# Patient Record
Sex: FEMALE | Race: WHITE
[De-identification: names, ages, dates, MRNs, and addresses within clinical notes are randomized per-mention and may not be internally consistent; named-entity substitution may affect disease eponyms.]

---

## 2018-02-24 ENCOUNTER — HOSPITAL ENCOUNTER (EMERGENCY)
Dept: HOSPITAL 25 - UCCORT | Age: 53
Discharge: HOME | End: 2018-02-24
Payer: COMMERCIAL

## 2018-02-24 VITALS — DIASTOLIC BLOOD PRESSURE: 89 MMHG | SYSTOLIC BLOOD PRESSURE: 129 MMHG

## 2018-02-24 DIAGNOSIS — M54.16: ICD-10-CM

## 2018-02-24 DIAGNOSIS — Z88.8: ICD-10-CM

## 2018-02-24 DIAGNOSIS — F41.8: ICD-10-CM

## 2018-02-24 DIAGNOSIS — J02.0: Primary | ICD-10-CM

## 2018-02-24 DIAGNOSIS — Z88.0: ICD-10-CM

## 2018-02-24 PROCEDURE — G0463 HOSPITAL OUTPT CLINIC VISIT: HCPCS

## 2018-02-24 PROCEDURE — 87502 INFLUENZA DNA AMP PROBE: CPT

## 2018-02-24 PROCEDURE — 99202 OFFICE O/P NEW SF 15 MIN: CPT

## 2018-02-24 PROCEDURE — 72110 X-RAY EXAM L-2 SPINE 4/>VWS: CPT

## 2018-02-24 PROCEDURE — 87651 STREP A DNA AMP PROBE: CPT

## 2018-02-24 NOTE — UC
Throat Pain/Nasal Adama HPI





- HPI Summary


HPI Summary: 


Starting yesterday with sore throat, body aches. Frontal sinus headache. Chills 

and fevers.





- History of Current Complaint


Stated Complaint: ST/BODY ACHES


Hx Obtained From: Patient


Hx Last Menstrual Period: 2/20/18


Pregnant?: No


Onset/Duration: Sudden Onset, Lasting Days - 1, Worse Since - lst night


Pain Intensity: 8


Cough: None


Associated Signs & Symptoms: Positive: Sinus Discomfort, Nasal Discharge, Fever


Related History: Seasonal Allergies





- Allergies/Home Medications


Allergies/Adverse Reactions: 


 Allergies











Allergy/AdvReac Type Severity Reaction Status Date / Time


 


amoxicillin [From Augmentin] AdvReac  Vomiting Verified 02/24/18 17:28


 


clavulanic acid AdvReac  Vomiting Verified 02/24/18 17:28





[From Augmentin]     











Home Medications: 


 Home Medications





Escitalopram Oxalate [Lexapro 20 mg] 20 mg PO DAILY 02/24/18 [History Confirmed 

02/24/18]


Ibuprofen TAB* [Advil TAB*] 600 mg PO Q6H PRN 02/24/18 [History Confirmed 02/24/ 18]


Lisinopril 20 mg PO DAILY 02/24/18 [History Confirmed 02/24/18]











PMH/Surg Hx/FS Hx/Imm Hx


Psychological History: Anxiety, Depression





- Surgical History


Surgical History: None





- Family History


Known Family History: 


   Negative: Hypertension





- Social History


Occupation: Employed Full-time


Lives: With Family


Alcohol Use: Weekly


Substance Use Type: None


Smoking Status (MU): Never Smoked Tobacco


Have You Smoked in the Last Year: No





Review of Systems


Constitutional: Fever, Chills


ENT: Sore Throat, Sinus Pain/Tenderness


Neurological: Numbness - Lateral posterior left leg


Is Patient Immunocompromised?: No


All Other Systems Reviewed And Are Negative: Yes





Physical Exam


Triage Information Reviewed: Yes


Appearance: No Pain Distress, Well-Nourished, Ill-Appearing


Vital Signs: 


 Initial Vital Signs











Temp  99.6 F   02/24/18 17:32


 


Pulse  94   02/24/18 17:32


 


Resp  16   02/24/18 17:32


 


BP  129/89   02/24/18 17:32


 


Pulse Ox  99   02/24/18 17:32











Vital Signs Reviewed: Yes


Eyes: Positive: Conjunctiva Clear


ENT: Positive: Pharyngeal erythema, Nasal congestion - with allergic changes, 

TMs normal


Neck exam: Normal


Respiratory Exam: Normal


Cardiovascular Exam: Normal


Musculoskeletal: Positive: Strength Limited @ - left calf


Neurological: Positive: Other: - decreased pinprick sensation L5S1 dermatome 

left leg. Absent DTR achilles left. 2+ bilateral patella and right achilles.


Psychological Exam: Normal


Skin Exam: Normal





Throat Pain/Nasal Course/Dx





- Differential Dx/Diagnosis


Differential Diagnosis/HQI/PQRI: Otitis Media, Pharyngitis, Tonsillitis


Provider Diagnoses: Strep pharyngitis.  Lumbar radiculopathy





Discharge





- Discharge Plan


Condition: Stable


Disposition: HOME


Prescriptions: 


Amoxicillin PO (*) [Amoxicillin 875 MG (*)] 875 mg PO BID #20 tab


Gabapentin CAP(*) [Neurontin 300 CAP(*)] 300 mg PO BEDTIME #30 cap


predniSONE TAB* [Deltasone TAB*] 20 mg PO DAILY #18 tab


Patient Education Materials:  Strep Throat (ED), Amoxicillin (By mouth), Lumbar 

Radiculopathy (ED), Gabapentin (By mouth)


Referrals: 


Rosalind Domingo [Primary Care Provider] - 5 Days (Pinched nerve in the back)

## 2018-02-24 NOTE — RAD
Indication: L5-S1 radiculopathy.



5 views of lumbar spine demonstrates disc space narrowing at L4-L5. The vertebral bodies

otherwise appear normal in height and alignment. There is mild levoscoliosis centered at

L2-L3. Pedicles appear intact. No lytic lesions are noted.



IMPRESSION: Degenerative disc disease at L4-L5 with mild levoscoliosis centered at L2-L3.

## 2019-02-18 ENCOUNTER — HOSPITAL ENCOUNTER (EMERGENCY)
Dept: HOSPITAL 25 - UCCORT | Age: 54
Discharge: HOME | End: 2019-02-18
Payer: COMMERCIAL

## 2019-02-18 VITALS — DIASTOLIC BLOOD PRESSURE: 82 MMHG | SYSTOLIC BLOOD PRESSURE: 132 MMHG

## 2019-02-18 DIAGNOSIS — R09.81: ICD-10-CM

## 2019-02-18 DIAGNOSIS — Z88.0: ICD-10-CM

## 2019-02-18 DIAGNOSIS — J02.0: Primary | ICD-10-CM

## 2019-02-18 DIAGNOSIS — I10: ICD-10-CM

## 2019-02-18 LAB
FLUAV RNA SPEC QL NAA+PROBE: NEGATIVE
FLUBV RNA SPEC QL NAA+PROBE: NEGATIVE

## 2019-02-18 PROCEDURE — 87651 STREP A DNA AMP PROBE: CPT

## 2019-02-18 PROCEDURE — G0463 HOSPITAL OUTPT CLINIC VISIT: HCPCS

## 2019-02-18 PROCEDURE — 99212 OFFICE O/P EST SF 10 MIN: CPT

## 2019-02-18 NOTE — UC
FLU HPI





- HPI Summary


HPI Summary: 


53-year-old female presents with onset of subjective fever, chills, and sore 

throat yesterday.  Associated with some mild nasal congestion and reports had 

one episode of nausea with vomiting yesterday.  Denies ear pain, dysphagia, 

chest pain, shortness of breath, cough, abdominal pain, diarrhea, dysuria, 

frequency, urgency, or hematuria.








- History of Current Complaint


Chief Complaint: UCGeneralIllness


Stated Complaint: FEVER,SORE THROAT,HEADACHE,NAUSEA


Time Seen by Provider: 02/18/19 15:34


Hx Obtained From: Patient


Hx Last Menstrual Period: 2/20/18


Pain Intensity: 6





- Allergy/Home Medications


Allergies/Adverse Reactions: 


 Allergies











Allergy/AdvReac Type Severity Reaction Status Date / Time


 


clavulanic acid AdvReac  Vomiting Verified 02/24/18 17:28





[From Augmentin]     














PMH/Surg Hx/FS Hx/Imm Hx


Previously Healthy: Yes


Cardiovascular History: Hypertension


Psychological History: Depression





- Surgical History


Surgical History: None





- Family History


Known Family History: Positive: Non-Contributory





- Social History


Occupation: Employed Full-time


Lives: Alone


Alcohol Use: Weekly


Substance Use Type: None


Smoking Status (MU): Never Smoked Tobacco


Have You Smoked in the Last Year: No





Review of Systems


All Other Systems Reviewed And Are Negative: Yes


Constitutional: Positive: Fever - Subjective, Chills, Fatigue


Skin: Negative: Rash


Eyes: Negative: Drainage, Eye Redness


ENT: Positive: Sore Throat, Nasal Discharge.  Negative: Ear Ache, Sinus 

Congestion, Sinus Pain/Tenderness


Respiratory: Negative: Shortness Of Breath, Cough


Cardiovascular: Negative: Palpitations, Chest Pain


Gastrointestinal: Positive: Vomiting.  Negative: Abdominal Pain, Diarrhea, 

Nausea


Genitourinary: Negative: Dysuria, Hematuria, Frequency, Urgency


Musculoskeletal: Positive: Negative


Neurological: Positive: Negative


Is Patient Immunocompromised?: No





Physical Exam





- Summary


Physical Exam Summary: 


GENERAL APPEARANCE: Well developed, well nourished, alert and cooperative, and 

appears to be in no acute distress.





EYES: PERRL, EOM intact. Vision is grossly intact.





EARS: External auditory canals and tympanic membranes clear, hearing grossly 

intact.





NOSE: Mild nasal congestion. No nasal discharge.





THROAT: Pharyngeal erythema. Tonsils 1+ with exudate.





NECK: Neck supple. Mild anterior cervical tenderness with lymphadenopathy.





CARDIAC: Normal S1 and S2. No S3, S4 or murmurs. Rhythm is regular. There is no 

peripheral edema, cyanosis or pallor. Extremities are warm and well perfused. 

Capillary refill is less than 2 seconds.





LUNGS: Clear to auscultation without rales, rhonchi, wheezing or diminished 

breath sounds.





ABDOMEN: Positive bowel sounds. Soft, nondistended, nontender. No guarding or 

rebound. No masses or hepatosplenomegally.





MUSKULOSKELETAL: ROM intact to all extremities. No joint erythema or 

tenderness. Normal muscular development. Normal gait.





SKIN: Skin normal color, texture and turgor with no lesions or eruptions.





Triage Information Reviewed: Yes


Vital Signs: 


 Initial Vital Signs











Temp  99.6 F   02/18/19 15:37


 


Pulse  94   02/18/19 15:37


 


Resp  17   02/18/19 15:37


 


BP  132/82   02/18/19 15:37


 


Pulse Ox  99   02/18/19 15:37











Vital Signs Reviewed: Yes





Diagnostics





- Laboratory


Diagnostic Studies Completed/Ordered: Rapid flu negative. Rapid strep positive.





Flu Course/Dx





- Course


Course Of Treatment: 53-year-old female presents with onset of subjective fever

, chills, and sore throat yesterday.  Associated with some mild nasal 

congestion and reports had one episode of nausea with vomiting yesterday.  

Denies ear pain, dysphagia, chest pain, shortness of breath, cough, abdominal 

pain, diarrhea, dysuria, frequency, urgency, or hematuria.  Afebrile.  Vital 

signs stable.  Ildefonso reveals an adult female in no acute distress.  Mild nasal 

congestion, pharyngeal erythema, 1+ tonsils with exudate, and some mild 

anterior cervical tenderness with lymphadenopathy.  Rapid flu was negative, 

rapid strep positive.  Patient reports allergy to Augmentin but states adverse 

reaction is GI discomfort therefore we will treat her with penicillin  mg 

twice a day 10 days as well as symptomatic treatment.  She is to return here 

or follow-up with a primary care provider in 5 days if symptoms do not improve.

  Intrasplenic hydrants and warning symptoms were reviewed with the patient.  

She verbalizes understanding and agrees with plan of care.





- Differential Dx/Diagnosis


Differential Diagnosis/HQI/PQRI: Bronchitis, Influenza, Pneumonia, Upper 

Respiratory Infection, Other - Pharyngitis


Provider Diagnosis: 


 Strep throat








Discharge





- Sign-Out/Discharge


Documenting (check all that apply): Patient Departure


All imaging exams completed and their final reports reviewed: No Studies





- Discharge Plan


Condition: Stable


Disposition: HOME


Prescriptions: 


Penicillin  MG TAB(NF) [Penicillin  mg Tab] 500 mg PO BID #20 tab


Patient Education Materials:  Strep Throat (ED)


Forms:  *Work Release


Referrals: 


Rosalind Domingo [Primary Care Provider] - 5 Days (If symptoms persist.)


Additional Instructions: 


Your rapid strep test in the clinic today was positive. We will start you on an 

antibiotic to treat the infection.





Start Penicillin  mg twice a day for 10 days. Be sure to take the entire 

course even if you are feeling better.





After you have been on antibiotics for 3 days, throw out your toothbrush and 

replace with a new one to prevent reinfection.





Drink plenty of fluids to avoid dehydration especially if you are running any 

fever.





Use salt water gargles several times a day.





Take over the counter acetaminophen (Tylenol) or ibuprofen (Advil, Motrin) 

according to directions as needed for pain or fever.





You may also use Chloraseptic spray or Cepacol lonzenges according to 

directions which contain a numbing medication and can provide some temporary 

relief from your sore throat.





Return here or follow up with your primary care provider in 5 days if symptoms 

persist.





Seek immediate medical attention in the emergency room if you have fever 

greater than 100.5 F despite taking acetaminophen or ibuprofen, are unable to 

swallow or develop drooling, are unable to open your mouth fully, are unable to 

eat or drink, have pain that is not relieved with over the counter pain 

medication, or have any difficulty breathing.





- Billing Disposition and Condition


Condition: STABLE


Disposition: Home